# Patient Record
Sex: MALE | Race: WHITE | NOT HISPANIC OR LATINO | Employment: FULL TIME | URBAN - NONMETROPOLITAN AREA
[De-identification: names, ages, dates, MRNs, and addresses within clinical notes are randomized per-mention and may not be internally consistent; named-entity substitution may affect disease eponyms.]

---

## 2017-10-12 ENCOUNTER — COMMUNICATION - GICH (OUTPATIENT)
Dept: FAMILY MEDICINE | Facility: OTHER | Age: 32
End: 2017-10-12

## 2017-10-12 DIAGNOSIS — I10 ESSENTIAL (PRIMARY) HYPERTENSION: ICD-10-CM

## 2018-02-27 ENCOUNTER — DOCUMENTATION ONLY (OUTPATIENT)
Dept: FAMILY MEDICINE | Facility: OTHER | Age: 33
End: 2018-02-27

## 2018-02-27 PROBLEM — J30.9 ALLERGIC RHINITIS: Status: ACTIVE | Noted: 2018-02-27

## 2018-02-27 PROBLEM — J45.909 ASTHMA: Status: ACTIVE | Noted: 2018-02-27

## 2018-02-27 RX ORDER — ALBUTEROL SULFATE 90 UG/1
2 AEROSOL, METERED RESPIRATORY (INHALATION) EVERY 4 HOURS PRN
COMMUNITY
Start: 2016-06-07

## 2018-02-27 RX ORDER — LISINOPRIL 20 MG/1
20 TABLET ORAL DAILY
COMMUNITY
Start: 2017-10-13

## 2021-01-02 ENCOUNTER — NURSE TRIAGE (OUTPATIENT)
Dept: NURSING | Facility: CLINIC | Age: 36
End: 2021-01-02

## 2021-01-02 NOTE — TELEPHONE ENCOUNTER
Patient's father is calling, patient also present. Reports he is passing blood in urine for 2 days. Has a slight back ache. Denies pain with urination. Denies fever. Denies passing large blood clots, or pure blood.   Advised to see PCP within 24 hours. Discussed since tomorrow is Sunday, he could visit an urgent care. Patient verbalized understanding and had no further questions.    Paige Dupree RN/[facility]        COVID 19 Nurse Triage Plan/Patient Instructions    Please be aware that novel coronavirus (COVID-19) may be circulating in the community. If you develop symptoms such as fever, cough, or SOB or if you have concerns about the presence of another infection including coronavirus (COVID-19), please contact your health care provider or visit [email or web address].     Disposition/Instructions    In-Person Visit with provider recommended. Reference Visit Selection Guide.    Thank you for taking steps to prevent the spread of this virus.  o Limit your contact with others.  o Wear a simple mask to cover your cough.  o Wash your hands well and often.    Resources    [facility]: About COVID-19: [email or web address]    CDC: What to Do If You're Sick: www.cdc.gov/coronavirus/2019-ncov/about/steps-when-sick.html    CDC: Ending Home Isolation: www.cdc.gov/coronavirus/2019-ncov/hcp/disposition-in-home-patients.html     CDC: Caring for Someone: www.cdc.gov/coronavirus/2019-ncov/if-you-are-sick/care-for-someone.html     [facility]: Interim Guidance for Hospital Discharge to Home: www.health.[facility].[place]/diseases/coronavirus/hcp/hospdischarge.pdf    [facility] clinical trials (COVID-19 research studies): [email or web address]/um-clinical-trials     Below are the COVID-19 hotlines at the [place] [facility] ([facility]). Interpreters are available.   o For health questions: Call 113-606-8807 or 1-773.871.2269 (7 a.m. to 7 p.m.)  o For questions about schools and childcare:  Call [phone] or 1-837.779.1313 (7 a.m. to 7 p.m.)     Additional Information    Negative: Shock suspected (e.g., cold/pale/clammy skin, too weak to stand, low BP, rapid pulse)    Negative: Sounds like a life-threatening emergency to the triager    Negative: Recent back or abdominal injury    Negative: Recent genital injury    Negative: Fever > 100.5 F (38.1 C)    Negative: Patient sounds very sick or weak to the triager    Negative: Known sickle cell disease    Negative: Taking Coumadin (warfarin) or other strong blood thinner, or known bleeding disorder (e.g., thrombocytopenia)    Negative: [1] Unable to urinate (or only a few drops) > 4 hours AND [2] bladder feels very full (e.g., palpable bladder or strong urge to urinate)    Negative: Passing pure blood or large blood clots (i.e., size > a dime) (Exception: cindy or small strands)    Negative: Urinary catheter, questions about    Side (flank) or back pain present    Blood in urine  (Exception: could be normal menstrual bleeding)    Protocols used: URINE - BLOOD IN-A-

## 2021-06-17 ENCOUNTER — OFFICE VISIT (OUTPATIENT)
Dept: FAMILY MEDICINE | Facility: OTHER | Age: 36
End: 2021-06-17

## 2021-06-17 VITALS
TEMPERATURE: 98.8 F | HEIGHT: 72 IN | OXYGEN SATURATION: 97 % | HEART RATE: 108 BPM | SYSTOLIC BLOOD PRESSURE: 130 MMHG | RESPIRATION RATE: 16 BRPM | BODY MASS INDEX: 42.66 KG/M2 | DIASTOLIC BLOOD PRESSURE: 84 MMHG | WEIGHT: 315 LBS

## 2021-06-17 DIAGNOSIS — H60.393 INFECTIVE OTITIS EXTERNA, BILATERAL: Primary | ICD-10-CM

## 2021-06-17 PROCEDURE — 99213 OFFICE O/P EST LOW 20 MIN: CPT | Performed by: NURSE PRACTITIONER

## 2021-06-17 RX ORDER — CIPROFLOXACIN AND DEXAMETHASONE 3; 1 MG/ML; MG/ML
4 SUSPENSION/ DROPS AURICULAR (OTIC) 2 TIMES DAILY
Qty: 2.8 ML | Refills: 0 | Status: SHIPPED | OUTPATIENT
Start: 2021-06-17 | End: 2021-06-24

## 2021-06-17 ASSESSMENT — MIFFLIN-ST. JEOR: SCORE: 2686.2

## 2021-06-17 ASSESSMENT — PAIN SCALES - GENERAL: PAINLEVEL: NO PAIN (1)

## 2021-06-17 NOTE — NURSING NOTE
Patient to the clinic today for a right earache he has had for 2 days after swimming.  Yanni Salazar LPN 6/17/2021   2:49 PM    Chief Complaint   Patient presents with     Ear Problem     R Ear       Initial /84 (BP Location: Right arm, Patient Position: Sitting, Cuff Size: Adult Large)   Pulse 108   Temp 98.8  F (37.1  C) (Tympanic)   Resp 16   Ht 1.829 m (6')   Wt (!) 172.8 kg (381 lb)   SpO2 97%   BMI 51.67 kg/m   Estimated body mass index is 51.67 kg/m  as calculated from the following:    Height as of this encounter: 1.829 m (6').    Weight as of this encounter: 172.8 kg (381 lb).  Medication Reconciliation: complete  Yanni Salazar LPN

## 2021-06-17 NOTE — PROGRESS NOTES
"ASSESSMENT/PLAN:  1. Infective otitis externa, bilateral    - ciprofloxacin-dexamethasone (CIPRODEX) 0.3-0.1 % otic suspension; Place 4 drops into both ears 2 times daily for 7 days  Dispense: 2.8 mL; Refill: 0      Discussed with the patient that he appears to have otitis externa of bilateral auditory canals. The patient was prescribed Ciprodex 4 drops BID x 7 days.     The patient was instructed the patient to keep bilateral auditory canals dry using a cotton ball when showering or bathing.      May use over-the-counter Tylenol or ibuprofen PRN    Discussed warning signs/symptoms indicative of need to f/u    Follow up if symptoms persist or worsen or concerns      I explained my diagnostic considerations and recommendations to the patient, who voiced understanding and agreement with the treatment plan. All questions were answered. We discussed potential side effects of any prescribed or recommended therapies, as well as expectations for response to treatments.        HPI:    Elmo Garcia is a 36 year old male  who presents to [facility] today for right otalgia that he has had for the past 2 days. He reports that he recently went swimming. He tried using debrox for the \"water stuck in his ear\" but this did not help. Rating his pain 1-2/10, but last night he had a pounding pain to the right ear that he rated 4-5/10. He reports taking ibuprofen, this somewhat helped his pain. History of swimmers ear. Denies history of otitis media or PE tubes. Denies fevers or chills.     Past Medical History:   Diagnosis Date     Allergic rhinitis     Environmental     Calculus of kidney     3/12/2012,Left; with mild hydronephrosis - passed spontaneously     Uncomplicated asthma     cold induced, exercise induced     Past Surgical History:   Procedure Laterality Date     EXCISE CYST GENERIC (LOCATION)      Left shoulder cyst removal     OTHER SURGICAL HISTORY      ,HERNIA REPAIR,Right,inguinal @ age 5 (picking up couch) "     Social History     Tobacco Use     Smoking status: Never Smoker     Smokeless tobacco: Never Used   Substance Use Topics     Alcohol use: Yes     Alcohol/week: 0.0 standard drinks     Comment: Alcoholic Drinks/day: occasional     Current Outpatient Medications   Medication Sig Dispense Refill     albuterol (PROAIR HFA/PROVENTIL HFA/VENTOLIN HFA) 108 (90 BASE) MCG/ACT Inhaler Inhale 2 puffs into the lungs every 4 hours as needed       Blood Pressure Monitoring ([employer] ADVANCED AUTOMATIC BP) REYNOLD As directed. Use as directed. Dx: HTN. 401.9       lisinopril (PRINIVIL/ZESTRIL) 20 MG tablet Take 20 mg by mouth daily       Allergies   Allergen Reactions     Iodine Itching and Anaphylaxis     Makes eyes and throat itch     Shellfish-Derived Products Itching and Anaphylaxis     Makes eyes and throat itch         Past medical history, past surgical history, current medications and allergies reviewed and accurate to the best of my knowledge.        ROS:  Refer to HPI    /84 (BP Location: Right arm, Patient Position: Sitting, Cuff Size: Adult Large)   Pulse 108   Temp 98.8  F (37.1  C) (Tympanic)   Resp 16   Ht 1.829 m (6')   Wt (!) 172.8 kg (381 lb)   SpO2 97%   BMI 51.67 kg/m      EXAM:  General Appearance: Well appearing male, appropriate appearance for age. No acute distress  Ears: Left TM intact, translucent with bony landmarks appreciated, no erythema, no effusion, no bulging, no purulence.  Right TM unable to visualize.  Left auditory canal mild swelling, erythema and yellow discharge noted.  Right auditory canal swelling and erythema of the auditory canal.  Normal external ears, non tender.  Neck: supple without adenopathy  Respiratory: normal chest wall and respirations.  Normal effort.  Clear to auscultation bilaterally, no wheezing, crackles or rhonchi.  No increased work of breathing.  No cough appreciated.  Cardiac: RRR with no murmurs  Psychological: normal affect, alert, oriented, and pleasant.

## 2023-08-03 ENCOUNTER — HOSPITAL ENCOUNTER (EMERGENCY)
Facility: OTHER | Age: 38
Discharge: HOME | End: 2023-08-03
Attending: PHYSICIAN ASSISTANT | Admitting: PHYSICIAN ASSISTANT

## 2023-08-03 VITALS
SYSTOLIC BLOOD PRESSURE: 135 MMHG | TEMPERATURE: 96.9 F | DIASTOLIC BLOOD PRESSURE: 72 MMHG | RESPIRATION RATE: 18 BRPM | HEART RATE: 82 BPM | OXYGEN SATURATION: 99 %

## 2023-08-03 DIAGNOSIS — N17.9 AKI (ACUTE KIDNEY INJURY) (H): ICD-10-CM

## 2023-08-03 DIAGNOSIS — E86.0 DEHYDRATION: ICD-10-CM

## 2023-08-03 DIAGNOSIS — R79.89 ELEVATED TROPONIN: ICD-10-CM

## 2023-08-03 LAB
ALBUMIN SERPL BCG-MCNC: 4.6 G/DL (ref 3.5–5.2)
ALBUMIN UR-MCNC: 70 MG/DL
ALP SERPL-CCNC: 100 U/L (ref 40–129)
ALT SERPL W P-5'-P-CCNC: 40 U/L (ref 0–70)
ANION GAP SERPL CALCULATED.3IONS-SCNC: 15 MMOL/L (ref 7–15)
ANION GAP SERPL CALCULATED.3IONS-SCNC: 16 MMOL/L (ref 7–15)
APPEARANCE UR: ABNORMAL
AST SERPL W P-5'-P-CCNC: 28 U/L (ref 0–45)
BASOPHILS # BLD AUTO: 0.1 10E3/UL (ref 0–0.2)
BASOPHILS NFR BLD AUTO: 0 %
BILIRUB SERPL-MCNC: 0.7 MG/DL
BILIRUB UR QL STRIP: NEGATIVE
BUN SERPL-MCNC: 19.5 MG/DL (ref 6–20)
BUN SERPL-MCNC: 20.3 MG/DL (ref 6–20)
CALCIUM SERPL-MCNC: 9.3 MG/DL (ref 8.6–10)
CALCIUM SERPL-MCNC: 9.5 MG/DL (ref 8.6–10)
CHLORIDE SERPL-SCNC: 98 MMOL/L (ref 98–107)
CHLORIDE SERPL-SCNC: 98 MMOL/L (ref 98–107)
CK SERPL-CCNC: 153 U/L (ref 39–308)
COLOR UR AUTO: YELLOW
CREAT SERPL-MCNC: 1.64 MG/DL (ref 0.67–1.17)
CREAT SERPL-MCNC: 1.82 MG/DL (ref 0.67–1.17)
DEPRECATED HCO3 PLAS-SCNC: 17 MMOL/L (ref 22–29)
DEPRECATED HCO3 PLAS-SCNC: 19 MMOL/L (ref 22–29)
EOSINOPHIL # BLD AUTO: 0.1 10E3/UL (ref 0–0.7)
EOSINOPHIL NFR BLD AUTO: 1 %
ERYTHROCYTE [DISTWIDTH] IN BLOOD BY AUTOMATED COUNT: 12.8 % (ref 10–15)
GFR SERPL CREATININE-BSD FRML MDRD: 48 ML/MIN/1.73M2
GFR SERPL CREATININE-BSD FRML MDRD: 54 ML/MIN/1.73M2
GLUCOSE SERPL-MCNC: 130 MG/DL (ref 70–99)
GLUCOSE SERPL-MCNC: 164 MG/DL (ref 70–99)
GLUCOSE UR STRIP-MCNC: NEGATIVE MG/DL
HCT VFR BLD AUTO: 42.4 % (ref 40–53)
HGB BLD-MCNC: 14.7 G/DL (ref 13.3–17.7)
HGB UR QL STRIP: NEGATIVE
HOLD SPECIMEN: NORMAL
HYALINE CASTS: 19 /LPF
IMM GRANULOCYTES # BLD: 0.1 10E3/UL
IMM GRANULOCYTES NFR BLD: 1 %
KETONES UR STRIP-MCNC: 10 MG/DL
LEUKOCYTE ESTERASE UR QL STRIP: NEGATIVE
LYMPHOCYTES # BLD AUTO: 2.7 10E3/UL (ref 0.8–5.3)
LYMPHOCYTES NFR BLD AUTO: 20 %
MAGNESIUM SERPL-MCNC: 1.7 MG/DL (ref 1.7–2.3)
MCH RBC QN AUTO: 30.6 PG (ref 26.5–33)
MCHC RBC AUTO-ENTMCNC: 34.7 G/DL (ref 31.5–36.5)
MCV RBC AUTO: 88 FL (ref 78–100)
MONOCYTES # BLD AUTO: 0.9 10E3/UL (ref 0–1.3)
MONOCYTES NFR BLD AUTO: 7 %
MUCOUS THREADS #/AREA URNS LPF: PRESENT /LPF
NEUTROPHILS # BLD AUTO: 9.5 10E3/UL (ref 1.6–8.3)
NEUTROPHILS NFR BLD AUTO: 71 %
NITRATE UR QL: NEGATIVE
NRBC # BLD AUTO: 0 10E3/UL
NRBC BLD AUTO-RTO: 0 /100
PH UR STRIP: 5.5 [PH] (ref 5–9)
PLATELET # BLD AUTO: 363 10E3/UL (ref 150–450)
POTASSIUM SERPL-SCNC: 3.9 MMOL/L (ref 3.4–5.3)
POTASSIUM SERPL-SCNC: 4.2 MMOL/L (ref 3.4–5.3)
PROT SERPL-MCNC: 7.7 G/DL (ref 6.4–8.3)
RBC # BLD AUTO: 4.81 10E6/UL (ref 4.4–5.9)
RBC URINE: 1 /HPF
SODIUM SERPL-SCNC: 131 MMOL/L (ref 136–145)
SODIUM SERPL-SCNC: 132 MMOL/L (ref 136–145)
SP GR UR STRIP: 1.01 (ref 1–1.03)
TROPONIN T SERPL HS-MCNC: 25 NG/L
TROPONIN T SERPL HS-MCNC: 27 NG/L
UROBILINOGEN UR STRIP-MCNC: NORMAL MG/DL
WBC # BLD AUTO: 13.4 10E3/UL (ref 4–11)
WBC URINE: 9 /HPF

## 2023-08-03 PROCEDURE — 99284 EMERGENCY DEPT VISIT MOD MDM: CPT | Mod: 25 | Performed by: PHYSICIAN ASSISTANT

## 2023-08-03 PROCEDURE — 82435 ASSAY OF BLOOD CHLORIDE: CPT | Performed by: PHYSICIAN ASSISTANT

## 2023-08-03 PROCEDURE — 96360 HYDRATION IV INFUSION INIT: CPT | Performed by: PHYSICIAN ASSISTANT

## 2023-08-03 PROCEDURE — 258N000003 HC RX IP 258 OP 636: Performed by: PHYSICIAN ASSISTANT

## 2023-08-03 PROCEDURE — 96361 HYDRATE IV INFUSION ADD-ON: CPT | Performed by: PHYSICIAN ASSISTANT

## 2023-08-03 PROCEDURE — 36415 COLL VENOUS BLD VENIPUNCTURE: CPT | Performed by: PHYSICIAN ASSISTANT

## 2023-08-03 PROCEDURE — 93005 ELECTROCARDIOGRAM TRACING: CPT | Performed by: PHYSICIAN ASSISTANT

## 2023-08-03 PROCEDURE — 85025 COMPLETE CBC W/AUTO DIFF WBC: CPT | Performed by: PHYSICIAN ASSISTANT

## 2023-08-03 PROCEDURE — 83735 ASSAY OF MAGNESIUM: CPT | Performed by: PHYSICIAN ASSISTANT

## 2023-08-03 PROCEDURE — 93010 ELECTROCARDIOGRAM REPORT: CPT | Performed by: INTERNAL MEDICINE

## 2023-08-03 PROCEDURE — 84484 ASSAY OF TROPONIN QUANT: CPT | Performed by: PHYSICIAN ASSISTANT

## 2023-08-03 PROCEDURE — 99283 EMERGENCY DEPT VISIT LOW MDM: CPT | Performed by: PHYSICIAN ASSISTANT

## 2023-08-03 PROCEDURE — 80053 COMPREHEN METABOLIC PANEL: CPT | Performed by: PHYSICIAN ASSISTANT

## 2023-08-03 PROCEDURE — 82550 ASSAY OF CK (CPK): CPT | Performed by: PHYSICIAN ASSISTANT

## 2023-08-03 PROCEDURE — 81001 URINALYSIS AUTO W/SCOPE: CPT | Performed by: PHYSICIAN ASSISTANT

## 2023-08-03 RX ADMIN — SODIUM CHLORIDE 1000 ML: 9 INJECTION, SOLUTION INTRAVENOUS at 17:50

## 2023-08-03 RX ADMIN — SODIUM CHLORIDE 1000 ML: 0.9 INJECTION, SOLUTION INTRAVENOUS at 20:00

## 2023-08-03 ASSESSMENT — ACTIVITIES OF DAILY LIVING (ADL)
ADLS_ACUITY_SCORE: 35
ADLS_ACUITY_SCORE: 35

## 2023-08-03 NOTE — ED TRIAGE NOTES
Patient here with heat exposure and cramping.  Patient has had heat stroke in the past.  Patient has been trying to drink fluids./62   Pulse 97   Resp 18   SpO2 95%        Triage Assessment       Row Name 08/03/23 4023       Triage Assessment (Adult)    Airway WDL WDL       Respiratory WDL    Respiratory WDL WDL       Skin Circulation/Temperature WDL    Skin Circulation/Temperature WDL WDL       Cardiac WDL    Cardiac WDL WDL       Peripheral/Neurovascular WDL    Peripheral Neurovascular WDL WDL       Cognitive/Neuro/Behavioral WDL    Cognitive/Neuro/Behavioral WDL WDL

## 2023-08-04 NOTE — ED PROVIDER NOTES
EMERGENCY DEPARTMENT ENCOUNTER      NAME: Elmo Garcia  AGE: 38 year old male  YOB: 1985  MRN: [id number]  EVALUATION DATE & TIME: 8/3/2023  5:37 PM    PCP: Mehul Veloz    ED PROVIDER: Blaine Whitfield PA-C       CHIEF COMPLAINT:  Chief Complaint   Patient presents with    Heat Exposure       FINAL IMPRESSION:  1. RODO (acute kidney injury) (H)    2. Dehydration    3. Elevated troponin        ED COURSE, MEDICAL DECISION MAKING, ASSESSMENT, AND PLAN:      The patient was interviewed and examined.  HPI and physical exam as below.  Differential diagnosis and MDM Key Documentation Elements as below.  Vitals and triage note were reviewed.  /72   Pulse 82   Temp 96.9  F (36.1  C)   Resp 18   SpO2 99%     Overall Impression/Treatment Plan/Discharge Info/Follow-up/Medical Necessity for Admission:  Elmo Garcia is a pleasant 38 year old male with history of obesity and heat injury who presents to the ER today for concerns of acute injury.  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  Patient states that he did not really eat or drink much during the day.  Patient states that he has extreme lightheadedness and muscle cramping throughout his body.  Also had a few episodes of near syncope.  Patient states that symptoms slightly got better when he got inside into the AC.  Also feeling better slightly with some oral fluids.  Patient is concerned about having a heat injury as patient has had a history of acute injury.  No chest pain, back pain, abdominal pain.  No vomiting.  No hematuria.  No headache.    Differential includes but is not limited to heat exposure, heat stroke, dehydration, acute renal injury, UTI, electrolyte abnormality, rhabdomyolysis    Patient today was afebrile with otherwise normal vitals.  Patient is in no acute distress.  Patient was not diaphoretic or clammy.  Patient with otherwise normal neurological and physical exam.    EKG was  normal-appearing with no ST segment deviation to suggest ACS/MI.  Initial troponin 25.  Troponin 2-hour pati 27, less than 7, less likely ACS/MI.  Leukocytosis WBC 13,400.  Creatinine 1.82.  Anion gap 16.  GFR 48.  Normal hepatic function.  Sodium 131.  Normal CK.  Normal magnesium.  UA does not show signs of UTI or hematuria.    Patient was given 2 L of normal saline.  Repeat labs were obtained.  Creatinine and proved to 1.64.  Sodium improved to 132.  GFR improved to 54.  Patient states that muscle cramps have resolved.  Patient's lightheadedness has resolved.    Assessment/plan:  Acute renal injury, elevated troponin, dehydration  -Patient with elevated troponin, white blood cell count, and elevated creatinine, most likely secondary to dehydration as patient states that he does not have any oral intake and being out in the heat today.  Patient was not hypothermic.  Symptoms did not resolve with use of IV fluids here in the ER.  No signs of ACS/MI.  No meningeal signs.  Lungs were clear.  No significant electrolyte abnormalities were noted.  -Patient encouraged to drink plenty of fluids while being outside.  Recommend not being in the heat for a few days.  If patient develops any worsening symptoms to include developing chest pain, headache, stiff neck, fever or has any worsening symptoms, patient's return to the ER for repeat evaluation  -Also recommend follow-up with primary care doctor next week for reevaluation.  Referral for family practice was entered.    Reassessments, Medications, Interventions, & Response to Treatments:  Patient symptoms improved with use of IV fluids.  Discussed laboratory results.    Consultations:  None    Decision Rules, Medical Calculators, and Risk Stratification Tools:  None    MDM Key Documentation Elements for Patient's Evaluation:  Differential diagnosis to include high risk considerations: As above  Escalation to admission/observation considered: Admission/observation  considered, but patient does not meet admission criteria  Discussions and management with other clinicians:    3a. Independent interpretation of testing performed by another health professional:  -No  3b. Discussion of management or test interpretation with another health professional: -No  Independent interpretation of tests:  Ordering and/or review of 3+ test(s)  Discussion of test interpretations with radiology:  No  History obtained from source other than patient or assessment requiring an independent historian:  No  Review of non-ED/external records:  review of 3+ records  Diagnostic tests considered but not ultimately performed/deferred:  - head CT   Prescription medications considered but not prescribed:  -Antibiotics  Chronic conditions affecting care:  -Acute injury  Care affected by social determinants of health:  -None    The patient's management involved:   - Laboratory studies  - Parenteral controlled substance      Pertinent Labs & Imaging studies reviewed. (See chart for details)  Results for orders placed or performed during the hospital encounter of 08/03/23   Comprehensive metabolic panel   Result Value Ref Range    Sodium 131 (L) 136 - 145 mmol/L    Potassium 3.9 3.4 - 5.3 mmol/L    Chloride 98 98 - 107 mmol/L    Carbon Dioxide (CO2) 17 (L) 22 - 29 mmol/L    Anion Gap 16 (H) 7 - 15 mmol/L    Urea Nitrogen 19.5 6.0 - 20.0 mg/dL    Creatinine 1.82 (H) 0.67 - 1.17 mg/dL    Calcium 9.3 8.6 - 10.0 mg/dL    Glucose 164 (H) 70 - 99 mg/dL    Alkaline Phosphatase 100 40 - 129 U/L    AST 28 0 - 45 U/L    ALT 40 0 - 70 U/L    Protein Total 7.7 6.4 - 8.3 g/dL    Albumin 4.6 3.5 - 5.2 g/dL    Bilirubin Total 0.7 <=1.2 mg/dL    GFR Estimate 48 (L) >60 mL/min/1.73m2   Result Value Ref Range    Magnesium 1.7 1.7 - 2.3 mg/dL   UA with Microscopic reflex to Culture    Specimen: Urine, Midstream   Result Value Ref Range    Color Urine Yellow Colorless, Straw, Light Yellow, Yellow    Appearance Urine Slightly Cloudy  (A) Clear    Glucose Urine Negative Negative mg/dL    Bilirubin Urine Negative Negative    Ketones Urine 10 (A) Negative mg/dL    Specific Gravity Urine 1.013 1.000 - 1.030    Blood Urine Negative Negative    pH Urine 5.5 5.0 - 9.0    Protein Albumin Urine 70 (A) Negative mg/dL    Urobilinogen Urine Normal Normal, 2.0 mg/dL    Nitrite Urine Negative Negative    Leukocyte Esterase Urine Negative Negative    Mucus Urine Present (A) None Seen /LPF    RBC Urine 1 <=2 /HPF    WBC Urine 9 (H) <=5 /HPF    Hyaline Casts Urine 19 (H) <=2 /LPF   Result Value Ref Range     39 - 308 U/L   Result Value Ref Range    Troponin T, High Sensitivity 25 (H) <=22 ng/L   CBC with platelets and differential   Result Value Ref Range    WBC Count 13.4 (H) 4.0 - 11.0 10e3/uL    RBC Count 4.81 4.40 - 5.90 10e6/uL    Hemoglobin 14.7 13.3 - 17.7 g/dL    Hematocrit 42.4 40.0 - 53.0 %    MCV 88 78 - 100 fL    MCH 30.6 26.5 - 33.0 pg    MCHC 34.7 31.5 - 36.5 g/dL    RDW 12.8 10.0 - 15.0 %    Platelet Count 363 150 - 450 10e3/uL    % Neutrophils 71 %    % Lymphocytes 20 %    % Monocytes 7 %    % Eosinophils 1 %    % Basophils 0 %    % Immature Granulocytes 1 %    NRBCs per 100 WBC 0 <1 /100    Absolute Neutrophils 9.5 (H) 1.6 - 8.3 10e3/uL    Absolute Lymphocytes 2.7 0.8 - 5.3 10e3/uL    Absolute Monocytes 0.9 0.0 - 1.3 10e3/uL    Absolute Eosinophils 0.1 0.0 - 0.7 10e3/uL    Absolute Basophils 0.1 0.0 - 0.2 10e3/uL    Absolute Immature Granulocytes 0.1 <=0.4 10e3/uL    Absolute NRBCs 0.0 10e3/uL   Extra Blue Top Tube   Result Value Ref Range    Hold Specimen x    Extra Red Top Tube   Result Value Ref Range    Hold Specimen x    Basic metabolic panel   Result Value Ref Range    Sodium 132 (L) 136 - 145 mmol/L    Potassium 4.2 3.4 - 5.3 mmol/L    Chloride 98 98 - 107 mmol/L    Carbon Dioxide (CO2) 19 (L) 22 - 29 mmol/L    Anion Gap 15 7 - 15 mmol/L    Urea Nitrogen 20.3 (H) 6.0 - 20.0 mg/dL    Creatinine 1.64 (H) 0.67 - 1.17 mg/dL    Calcium  9.5 8.6 - 10.0 mg/dL    Glucose 130 (H) 70 - 99 mg/dL    GFR Estimate 54 (L) >60 mL/min/1.73m2   Result Value Ref Range    Troponin T, High Sensitivity 27 (H) <=22 ng/L   Extra Serum Separator Tube (SST)   Result Value Ref Range    Hold Specimen x      No results found for: ABORH      A shared decision making model was used. Time was taken to answer all questions.  Patient and/or associated parties understood and were agreeable to treatment plan.  Plan and all results were discussed. Warning signs and close return precautions to return to the ED given. Copy of results given. Discharged in stable condition. Discharged with discharge instructions outlining plan for further care and follow up.        PPE worn during patient evaluation:  Mask: Yes, surgical  Eye Protection: No  Gown: No  Hair cover: No  Face Shield: No  Patient wearing a mask: yes      MEDICATIONS GIVEN IN THE EMERGENCY:  Medications   0.9% sodium chloride BOLUS (0 mLs Intravenous Stopped 8/3/23 1858)   0.9% sodium chloride BOLUS (0 mLs Intravenous Stopped 8/3/23 2103)       NEW PRESCRIPTIONS STARTED AT TODAY'S ER VISIT:  Discharge Medication List as of 8/3/2023  9:07 PM             =================================================================    HPI  Elmo Garcia is a pleasant 38 year old male with history of obesity and heat injury who presents to the ER today for concerns of acute injury.  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  Patient states that he did not really eat or drink much during the day.  Patient states that he has extreme lightheadedness and muscle cramping throughout his body.  Also had a few episodes of near syncope.  Patient states that symptoms slightly got better when he got inside into the AC.  Also feeling better slightly with some oral fluids.  Patient is concerned about having a heat injury as patient has had a history of acute injury.  No chest pain, back pain, abdominal pain.  No vomiting.   No hematuria.  No headache.      REVIEW OF SYSTEMS   Review of Systems  As above, otherwise ROS is unremarkable.      PAST MEDICAL HISTORY:  Past Medical History:   Diagnosis Date    Allergic rhinitis     Environmental    Calculus of kidney     3/12/2012,Left; with mild hydronephrosis - passed spontaneously    Uncomplicated asthma     cold induced, exercise induced       PAST SURGICAL HISTORY:  Past Surgical History:   Procedure Laterality Date    EXCISE CYST GENERIC (LOCATION)      Left shoulder cyst removal    OTHER SURGICAL HISTORY      ,HERNIA REPAIR,Right,inguinal @ age 5 (picking up couch)       CURRENT MEDICATIONS:    Current Outpatient Medications   Medication Instructions    albuterol (PROAIR HFA/PROVENTIL HFA/VENTOLIN HFA) 108 (90 BASE) MCG/ACT Inhaler 2 puffs, Inhalation, EVERY 4 HOURS PRN    Blood Pressure Monitoring ([employer] ADVANCED AUTOMATIC BP) REYNOLD As directed. Use as directed. Dx: HTN. 401.9    lisinopril (ZESTRIL) 20 mg, Oral, DAILY       ALLERGIES:  Allergies   Allergen Reactions    Iodine Itching and Anaphylaxis     Makes eyes and throat itch    Shellfish-Derived Products Itching and Anaphylaxis     Makes eyes and throat itch       FAMILY HISTORY:  Family History   Problem Relation Age of Onset    Family History Negative Mother         Good Health    Family History Negative Father         Good Health    Cancer Paternal Grandfather         Cancer,lymphoma    Family History Negative Brother         Good Health,being watched for HTN    Family History Negative Brother         Good Health       SOCIAL HISTORY:   Social History     Socioeconomic History    Marital status: Single   Tobacco Use    Smoking status: Never    Smokeless tobacco: Never   Substance and Sexual Activity    Alcohol use: Yes     Alcohol/week: 0.0 standard drinks of alcohol     Comment: Alcoholic Drinks/day: occasional    Drug use: Not Currently    Sexual activity: Not Currently   Social History Narrative    Works at [employer].        PHYSICAL EXAM    VITAL SIGNS: /72   Pulse 82   Temp 96.9  F (36.1  C)   Resp 18   SpO2 99%     No data found.      Physical Exam  Vitals and nursing note reviewed.   Constitutional:       Appearance: Normal appearance. He is obese. He is not ill-appearing or diaphoretic.   HENT:      Nose: Nose normal.      Mouth/Throat:      Mouth: Mucous membranes are moist.      Pharynx: Oropharynx is clear.   Eyes:      Conjunctiva/sclera: Conjunctivae normal.      Pupils: Pupils are equal, round, and reactive to light.   Cardiovascular:      Rate and Rhythm: Normal rate and regular rhythm.      Pulses: Normal pulses.      Heart sounds: Normal heart sounds.   Pulmonary:      Effort: Pulmonary effort is normal.      Breath sounds: Normal breath sounds.   Abdominal:      General: Abdomen is flat. Bowel sounds are normal.      Palpations: Abdomen is soft.      Tenderness: There is no abdominal tenderness.   Musculoskeletal:         General: No tenderness. Normal range of motion.      Cervical back: Normal range of motion and neck supple. No rigidity or tenderness.   Skin:     General: Skin is warm and dry.      Capillary Refill: Capillary refill takes less than 2 seconds.   Neurological:      General: No focal deficit present.      Mental Status: He is alert and oriented to person, place, and time.      Cranial Nerves: No cranial nerve deficit.      Sensory: No sensory deficit.      Motor: No weakness.      Gait: Gait normal.   Psychiatric:         Mood and Affect: Mood normal.              LABS & RADIOLOGY:  All pertinent labs reviewed and interpreted. Reviewed all pertinent imaging. Please see official radiology report.  Results for orders placed or performed during the hospital encounter of 08/03/23   Comprehensive metabolic panel   Result Value Ref Range    Sodium 131 (L) 136 - 145 mmol/L    Potassium 3.9 3.4 - 5.3 mmol/L    Chloride 98 98 - 107 mmol/L    Carbon Dioxide (CO2) 17 (L) 22 - 29 mmol/L    Anion Gap  16 (H) 7 - 15 mmol/L    Urea Nitrogen 19.5 6.0 - 20.0 mg/dL    Creatinine 1.82 (H) 0.67 - 1.17 mg/dL    Calcium 9.3 8.6 - 10.0 mg/dL    Glucose 164 (H) 70 - 99 mg/dL    Alkaline Phosphatase 100 40 - 129 U/L    AST 28 0 - 45 U/L    ALT 40 0 - 70 U/L    Protein Total 7.7 6.4 - 8.3 g/dL    Albumin 4.6 3.5 - 5.2 g/dL    Bilirubin Total 0.7 <=1.2 mg/dL    GFR Estimate 48 (L) >60 mL/min/1.73m2   Result Value Ref Range    Magnesium 1.7 1.7 - 2.3 mg/dL   UA with Microscopic reflex to Culture    Specimen: Urine, Midstream   Result Value Ref Range    Color Urine Yellow Colorless, Straw, Light Yellow, Yellow    Appearance Urine Slightly Cloudy (A) Clear    Glucose Urine Negative Negative mg/dL    Bilirubin Urine Negative Negative    Ketones Urine 10 (A) Negative mg/dL    Specific Gravity Urine 1.013 1.000 - 1.030    Blood Urine Negative Negative    pH Urine 5.5 5.0 - 9.0    Protein Albumin Urine 70 (A) Negative mg/dL    Urobilinogen Urine Normal Normal, 2.0 mg/dL    Nitrite Urine Negative Negative    Leukocyte Esterase Urine Negative Negative    Mucus Urine Present (A) None Seen /LPF    RBC Urine 1 <=2 /HPF    WBC Urine 9 (H) <=5 /HPF    Hyaline Casts Urine 19 (H) <=2 /LPF   Result Value Ref Range     39 - 308 U/L   Result Value Ref Range    Troponin T, High Sensitivity 25 (H) <=22 ng/L   CBC with platelets and differential   Result Value Ref Range    WBC Count 13.4 (H) 4.0 - 11.0 10e3/uL    RBC Count 4.81 4.40 - 5.90 10e6/uL    Hemoglobin 14.7 13.3 - 17.7 g/dL    Hematocrit 42.4 40.0 - 53.0 %    MCV 88 78 - 100 fL    MCH 30.6 26.5 - 33.0 pg    MCHC 34.7 31.5 - 36.5 g/dL    RDW 12.8 10.0 - 15.0 %    Platelet Count 363 150 - 450 10e3/uL    % Neutrophils 71 %    % Lymphocytes 20 %    % Monocytes 7 %    % Eosinophils 1 %    % Basophils 0 %    % Immature Granulocytes 1 %    NRBCs per 100 WBC 0 <1 /100    Absolute Neutrophils 9.5 (H) 1.6 - 8.3 10e3/uL    Absolute Lymphocytes 2.7 0.8 - 5.3 10e3/uL    Absolute Monocytes 0.9 0.0  - 1.3 10e3/uL    Absolute Eosinophils 0.1 0.0 - 0.7 10e3/uL    Absolute Basophils 0.1 0.0 - 0.2 10e3/uL    Absolute Immature Granulocytes 0.1 <=0.4 10e3/uL    Absolute NRBCs 0.0 10e3/uL   Extra Blue Top Tube   Result Value Ref Range    Hold Specimen x    Extra Red Top Tube   Result Value Ref Range    Hold Specimen x    Basic metabolic panel   Result Value Ref Range    Sodium 132 (L) 136 - 145 mmol/L    Potassium 4.2 3.4 - 5.3 mmol/L    Chloride 98 98 - 107 mmol/L    Carbon Dioxide (CO2) 19 (L) 22 - 29 mmol/L    Anion Gap 15 7 - 15 mmol/L    Urea Nitrogen 20.3 (H) 6.0 - 20.0 mg/dL    Creatinine 1.64 (H) 0.67 - 1.17 mg/dL    Calcium 9.5 8.6 - 10.0 mg/dL    Glucose 130 (H) 70 - 99 mg/dL    GFR Estimate 54 (L) >60 mL/min/1.73m2   Result Value Ref Range    Troponin T, High Sensitivity 27 (H) <=22 ng/L   Extra Serum Separator Tube (SST)   Result Value Ref Range    Hold Specimen x      No orders to display         EKG:    No prior EKG available for comparison. EKG reviewed at 1807.  1) Rhythm: NSR  2) Rate: ventricular rate 85 bpm.  3) QRS Axis: No axis deviation  4) P waves/ MS interval: Sinus. Nml ANNETTE. No atrial enlargement  5) QRS complex: Narrow. No BBB. No ventricular hypertrophy. No pathological Q waves.  6) ST Segment: No acute ST segment elevation or depression  7) T waves: No T wave inversions. No peaked or flattened T waves.     I have independently reviewed and interpreted today's EKG, pending cardiologist over read.       I, Vahid Whitfield PA-C, personally performed the services described in this documentation, and it is both accurate and complete.       Blaine Whitfield PA-C  08/05/23 0643

## 2023-08-04 NOTE — DISCHARGE INSTRUCTIONS
-Continue to drink plenty of fluids  -Follow-up with primary care doctor next week for reevaluation and repeat lab testing  -Return to the ER for any worsening symptoms to include chest pain, fever, abdominal pain, flank or back pain, headache, passing out, or any other concerning symptom.

## 2023-08-09 LAB
ATRIAL RATE - MUSE: 85 BPM
DIASTOLIC BLOOD PRESSURE - MUSE: NORMAL MMHG
INTERPRETATION ECG - MUSE: NORMAL
P AXIS - MUSE: 11 DEGREES
PR INTERVAL - MUSE: 132 MS
QRS DURATION - MUSE: 86 MS
QT - MUSE: 370 MS
QTC - MUSE: 440 MS
R AXIS - MUSE: 18 DEGREES
SYSTOLIC BLOOD PRESSURE - MUSE: NORMAL MMHG
T AXIS - MUSE: 3 DEGREES
VENTRICULAR RATE- MUSE: 85 BPM